# Patient Record
Sex: FEMALE | Race: WHITE | Employment: OTHER | URBAN - METROPOLITAN AREA
[De-identification: names, ages, dates, MRNs, and addresses within clinical notes are randomized per-mention and may not be internally consistent; named-entity substitution may affect disease eponyms.]

---

## 2017-01-01 ENCOUNTER — OFFICE VISIT (OUTPATIENT)
Dept: GERIATRIC MEDICINE | Age: 82
End: 2017-01-01

## 2017-01-01 ENCOUNTER — OFFICE VISIT (OUTPATIENT)
Dept: PULMONOLOGY | Age: 82
End: 2017-01-01

## 2017-01-01 VITALS
SYSTOLIC BLOOD PRESSURE: 174 MMHG | OXYGEN SATURATION: 96 % | RESPIRATION RATE: 18 BRPM | TEMPERATURE: 96 F | DIASTOLIC BLOOD PRESSURE: 58 MMHG | HEART RATE: 91 BPM

## 2017-01-01 VITALS
TEMPERATURE: 97.6 F | RESPIRATION RATE: 18 BRPM | DIASTOLIC BLOOD PRESSURE: 73 MMHG | OXYGEN SATURATION: 98 % | HEART RATE: 86 BPM | SYSTOLIC BLOOD PRESSURE: 139 MMHG

## 2017-01-01 VITALS
DIASTOLIC BLOOD PRESSURE: 96 MMHG | RESPIRATION RATE: 21 BRPM | TEMPERATURE: 97.5 F | HEART RATE: 70 BPM | SYSTOLIC BLOOD PRESSURE: 193 MMHG | HEART RATE: 78 BPM | OXYGEN SATURATION: 99 % | SYSTOLIC BLOOD PRESSURE: 118 MMHG | HEIGHT: 67 IN | DIASTOLIC BLOOD PRESSURE: 84 MMHG

## 2017-01-01 VITALS
SYSTOLIC BLOOD PRESSURE: 133 MMHG | DIASTOLIC BLOOD PRESSURE: 44 MMHG | TEMPERATURE: 98.4 F | OXYGEN SATURATION: 96 % | HEART RATE: 82 BPM

## 2017-01-01 VITALS
DIASTOLIC BLOOD PRESSURE: 87 MMHG | RESPIRATION RATE: 18 BRPM | HEART RATE: 78 BPM | SYSTOLIC BLOOD PRESSURE: 120 MMHG | TEMPERATURE: 97 F

## 2017-01-01 VITALS
RESPIRATION RATE: 18 BRPM | SYSTOLIC BLOOD PRESSURE: 115 MMHG | HEART RATE: 83 BPM | OXYGEN SATURATION: 92 % | TEMPERATURE: 98.8 F | DIASTOLIC BLOOD PRESSURE: 60 MMHG

## 2017-01-01 VITALS
HEART RATE: 82 BPM | TEMPERATURE: 97.7 F | DIASTOLIC BLOOD PRESSURE: 63 MMHG | SYSTOLIC BLOOD PRESSURE: 107 MMHG | OXYGEN SATURATION: 100 % | RESPIRATION RATE: 18 BRPM

## 2017-01-01 DIAGNOSIS — R53.1 WEAKNESS: ICD-10-CM

## 2017-01-01 DIAGNOSIS — E87.70 FLUID OVERLOAD, UNSPECIFIED: ICD-10-CM

## 2017-01-01 DIAGNOSIS — J18.9 PNEUMONIA DUE TO INFECTIOUS ORGANISM, UNSPECIFIED LATERALITY, UNSPECIFIED PART OF LUNG: Primary | ICD-10-CM

## 2017-01-01 DIAGNOSIS — N18.9 CHRONIC KIDNEY DISEASE, UNSPECIFIED CKD STAGE: ICD-10-CM

## 2017-01-01 DIAGNOSIS — E87.70 HYPERVOLEMIA, UNSPECIFIED HYPERVOLEMIA TYPE: Primary | ICD-10-CM

## 2017-01-01 DIAGNOSIS — G47.33 OSA (OBSTRUCTIVE SLEEP APNEA): ICD-10-CM

## 2017-01-01 DIAGNOSIS — R13.10 DYSPHAGIA, UNSPECIFIED TYPE: Primary | ICD-10-CM

## 2017-01-01 DIAGNOSIS — J44.1 COPD EXACERBATION (HCC): Primary | ICD-10-CM

## 2017-01-01 DIAGNOSIS — E11.21 TYPE 2 DIABETES MELLITUS WITH DIABETIC NEPHROPATHY, UNSPECIFIED LONG TERM INSULIN USE STATUS: ICD-10-CM

## 2017-01-01 DIAGNOSIS — J18.9 PNEUMONIA DUE TO INFECTIOUS ORGANISM, UNSPECIFIED LATERALITY, UNSPECIFIED PART OF LUNG: ICD-10-CM

## 2017-01-01 DIAGNOSIS — R13.10 DYSPHAGIA, UNSPECIFIED TYPE: ICD-10-CM

## 2017-01-01 DIAGNOSIS — J44.9 CHRONIC OBSTRUCTIVE PULMONARY DISEASE, UNSPECIFIED COPD TYPE (HCC): Primary | ICD-10-CM

## 2017-01-01 DIAGNOSIS — R25.1 TREMORS OF NERVOUS SYSTEM: ICD-10-CM

## 2017-01-01 DIAGNOSIS — F41.9 ANXIETY: ICD-10-CM

## 2017-01-01 DIAGNOSIS — E87.70 HYPERVOLEMIA, UNSPECIFIED HYPERVOLEMIA TYPE: ICD-10-CM

## 2017-01-01 DIAGNOSIS — R09.02 HYPOXEMIA: ICD-10-CM

## 2017-01-01 DIAGNOSIS — K59.00 CONSTIPATION, UNSPECIFIED CONSTIPATION TYPE: ICD-10-CM

## 2017-01-01 DIAGNOSIS — J44.9 CHRONIC OBSTRUCTIVE PULMONARY DISEASE, UNSPECIFIED COPD TYPE (HCC): ICD-10-CM

## 2017-01-01 DIAGNOSIS — I50.9 CONGESTIVE HEART FAILURE, UNSPECIFIED CONGESTIVE HEART FAILURE CHRONICITY, UNSPECIFIED CONGESTIVE HEART FAILURE TYPE: ICD-10-CM

## 2017-01-01 DIAGNOSIS — I10 ESSENTIAL HYPERTENSION: Primary | ICD-10-CM

## 2017-01-01 DIAGNOSIS — E78.49 OTHER HYPERLIPIDEMIA: ICD-10-CM

## 2017-01-01 DIAGNOSIS — E11.42 DIABETIC PERIPHERAL NEUROPATHY (HCC): ICD-10-CM

## 2017-01-01 DIAGNOSIS — H61.23 BILATERAL IMPACTED CERUMEN: ICD-10-CM

## 2017-01-01 DIAGNOSIS — G47.33 OSA (OBSTRUCTIVE SLEEP APNEA): Primary | ICD-10-CM

## 2017-01-01 DIAGNOSIS — R63.5 ABNORMAL WEIGHT GAIN: ICD-10-CM

## 2017-01-01 DIAGNOSIS — E11.22 TYPE 2 DIABETES MELLITUS WITH STAGE 2 CHRONIC KIDNEY DISEASE, UNSPECIFIED LONG TERM INSULIN USE STATUS: Primary | ICD-10-CM

## 2017-01-01 DIAGNOSIS — E11.9 TYPE 2 DIABETES MELLITUS WITHOUT COMPLICATION, UNSPECIFIED LONG TERM INSULIN USE STATUS: Primary | ICD-10-CM

## 2017-01-01 DIAGNOSIS — G31.84 MCI (MILD COGNITIVE IMPAIRMENT): ICD-10-CM

## 2017-01-01 DIAGNOSIS — E87.5 HYPERKALEMIA: Primary | ICD-10-CM

## 2017-01-01 DIAGNOSIS — E11.9 TYPE 2 DIABETES MELLITUS WITHOUT COMPLICATION, UNSPECIFIED LONG TERM INSULIN USE STATUS: ICD-10-CM

## 2017-01-01 DIAGNOSIS — D64.9 ANEMIA, UNSPECIFIED TYPE: ICD-10-CM

## 2017-01-01 DIAGNOSIS — E78.5 HYPERLIPIDEMIA, UNSPECIFIED HYPERLIPIDEMIA TYPE: ICD-10-CM

## 2017-01-01 DIAGNOSIS — N18.2 TYPE 2 DIABETES MELLITUS WITH STAGE 2 CHRONIC KIDNEY DISEASE, UNSPECIFIED LONG TERM INSULIN USE STATUS: Primary | ICD-10-CM

## 2017-01-01 DIAGNOSIS — I10 ESSENTIAL HYPERTENSION: ICD-10-CM

## 2017-01-01 DIAGNOSIS — E87.70 FLUID OVERLOAD, UNSPECIFIED: Primary | ICD-10-CM

## 2017-01-01 DIAGNOSIS — E87.5 HYPERKALEMIA: ICD-10-CM

## 2017-01-01 DIAGNOSIS — R30.0 DYSURIA: ICD-10-CM

## 2017-01-01 LAB
AVERAGE GLUCOSE: NORMAL
AVERAGE GLUCOSE: NORMAL
BASOPHILS ABSOLUTE: 0 /ΜL
BASOPHILS ABSOLUTE: ABNORMAL /ΜL
BASOPHILS ABSOLUTE: ABNORMAL /ΜL
BASOPHILS RELATIVE PERCENT: 0.6 %
BASOPHILS RELATIVE PERCENT: ABNORMAL %
BASOPHILS RELATIVE PERCENT: ABNORMAL %
BUN BLDV-MCNC: 31 MG/DL
BUN BLDV-MCNC: 36 MG/DL
BUN BLDV-MCNC: 37 MG/DL
BUN BLDV-MCNC: 39 MG/DL
CALCIUM SERPL-MCNC: 9 MG/DL
CALCIUM SERPL-MCNC: 9.4 MG/DL
CALCIUM SERPL-MCNC: 9.5 MG/DL
CALCIUM SERPL-MCNC: 9.7 MG/DL
CHLORIDE BLD-SCNC: 100 MMOL/L
CHLORIDE BLD-SCNC: 103 MMOL/L
CHLORIDE BLD-SCNC: 96 MMOL/L
CHLORIDE BLD-SCNC: 99 MMOL/L
CHOLESTEROL, TOTAL: 122 MG/DL
CHOLESTEROL, TOTAL: 124 MG/DL
CHOLESTEROL/HDL RATIO: ABNORMAL
CHOLESTEROL/HDL RATIO: NORMAL
CO2: 25 MMOL/L
CO2: 27 MMOL/L
CO2: 28 MMOL/L
CO2: 29 MMOL/L
CREAT SERPL-MCNC: 1.43 MG/DL
CREAT SERPL-MCNC: 1.84 MG/DL
CREAT SERPL-MCNC: 1.96 MG/DL
CREAT SERPL-MCNC: 2.02 MG/DL
EOSINOPHILS ABSOLUTE: 0.6 /ΜL
EOSINOPHILS ABSOLUTE: ABNORMAL /ΜL
EOSINOPHILS ABSOLUTE: ABNORMAL /ΜL
EOSINOPHILS RELATIVE PERCENT: 7 %
EOSINOPHILS RELATIVE PERCENT: ABNORMAL %
EOSINOPHILS RELATIVE PERCENT: ABNORMAL %
FERRITIN: 702 NG/ML (ref 9–150)
GFR CALCULATED: 34.6
GFR CALCULATED: NORMAL
GLUCOSE BLD-MCNC: 127 MG/DL
GLUCOSE BLD-MCNC: 130 MG/DL
GLUCOSE BLD-MCNC: 200 MG/DL
GLUCOSE BLD-MCNC: 87 MG/DL
HBA1C MFR BLD: 6.7 %
HBA1C MFR BLD: 7.3 %
HCT VFR BLD CALC: 26.9 % (ref 36–46)
HCT VFR BLD CALC: 27.5 % (ref 36–46)
HCT VFR BLD CALC: 28.6 % (ref 36–46)
HCT VFR BLD CALC: 31.8 % (ref 36–46)
HDLC SERPL-MCNC: 32 MG/DL (ref 35–70)
HDLC SERPL-MCNC: 35 MG/DL (ref 35–70)
HEMOGLOBIN: 10.2 G/DL (ref 12–16)
HEMOGLOBIN: 8.6 G/DL (ref 12–16)
HEMOGLOBIN: 9.1 G/DL (ref 12–16)
IRON: 48
LDL CHOLESTEROL CALCULATED: 49 MG/DL (ref 0–160)
LDL CHOLESTEROL CALCULATED: NORMAL MG/DL (ref 0–160)
LYMPHOCYTES ABSOLUTE: 1.2 /ΜL
LYMPHOCYTES ABSOLUTE: ABNORMAL /ΜL
LYMPHOCYTES ABSOLUTE: ABNORMAL /ΜL
LYMPHOCYTES RELATIVE PERCENT: 14 %
LYMPHOCYTES RELATIVE PERCENT: ABNORMAL %
LYMPHOCYTES RELATIVE PERCENT: ABNORMAL %
MCH RBC QN AUTO: 32.6 PG
MCH RBC QN AUTO: 32.7 PG
MCH RBC QN AUTO: 34.7 PG
MCHC RBC AUTO-ENTMCNC: 31.9 G/DL
MCHC RBC AUTO-ENTMCNC: 32 G/DL
MCHC RBC AUTO-ENTMCNC: 32.2 G/DL
MCV RBC AUTO: 101.2 FL
MCV RBC AUTO: 102.4 FL
MCV RBC AUTO: 108.6 FL
MONOCYTES ABSOLUTE: 0.3 /ΜL
MONOCYTES ABSOLUTE: ABNORMAL /ΜL
MONOCYTES ABSOLUTE: ABNORMAL /ΜL
MONOCYTES RELATIVE PERCENT: 4.3 %
MONOCYTES RELATIVE PERCENT: ABNORMAL %
MONOCYTES RELATIVE PERCENT: ABNORMAL %
NEUTROPHILS ABSOLUTE: 6.3 /ΜL
NEUTROPHILS ABSOLUTE: ABNORMAL /ΜL
NEUTROPHILS ABSOLUTE: ABNORMAL /ΜL
NEUTROPHILS RELATIVE PERCENT: 73 %
NEUTROPHILS RELATIVE PERCENT: ABNORMAL %
NEUTROPHILS RELATIVE PERCENT: ABNORMAL %
PLATELET # BLD: 162 K/ΜL
PLATELET # BLD: 200 K/ΜL
PLATELET # BLD: 262 K/ΜL
PMV BLD AUTO: ABNORMAL FL
POTASSIUM (K+): 4.9
POTASSIUM (K+): 5.1
POTASSIUM (K+): 5.5
POTASSIUM SERPL-SCNC: 5.4 MMOL/L
POTASSIUM SERPL-SCNC: 5.4 MMOL/L
POTASSIUM SERPL-SCNC: 5.8 MMOL/L
POTASSIUM SERPL-SCNC: 6 MMOL/L
RBC # BLD: 2.48 10^6/ΜL
RBC # BLD: 2.79 10^6/ΜL
RBC # BLD: 3.15 10^6/ΜL
RETICULOCYTE ABSOLUTE COUNT: 0.05
RETICULOCYTE COUNT PCT: 2
SODIUM BLD-SCNC: 139 MMOL/L
SODIUM BLD-SCNC: 139 MMOL/L
SODIUM BLD-SCNC: 141 MMOL/L
SODIUM BLD-SCNC: 143 MMOL/L
TRIGL SERPL-MCNC: 201 MG/DL
TRIGL SERPL-MCNC: 205 MG/DL
VITAMIN B-12: >2000
VLDLC SERPL CALC-MCNC: ABNORMAL MG/DL
VLDLC SERPL CALC-MCNC: NORMAL MG/DL
WBC # BLD: 11 10^3/ML
WBC # BLD: 8.3 10^3/ML
WBC # BLD: 8.7 10^3/ML

## 2017-01-01 PROCEDURE — 99309 SBSQ NF CARE MODERATE MDM 30: CPT | Performed by: NURSE PRACTITIONER

## 2017-01-01 PROCEDURE — 1123F ACP DISCUSS/DSCN MKR DOCD: CPT | Performed by: NURSE PRACTITIONER

## 2017-01-01 PROCEDURE — G8926 SPIRO NO PERF OR DOC: HCPCS | Performed by: INTERNAL MEDICINE

## 2017-01-01 PROCEDURE — 1090F PRES/ABSN URINE INCON ASSESS: CPT | Performed by: INTERNAL MEDICINE

## 2017-01-01 PROCEDURE — 1123F ACP DISCUSS/DSCN MKR DOCD: CPT | Performed by: INTERNAL MEDICINE

## 2017-01-01 PROCEDURE — 99308 SBSQ NF CARE LOW MDM 20: CPT | Performed by: INTERNAL MEDICINE

## 2017-01-01 PROCEDURE — G8484 FLU IMMUNIZE NO ADMIN: HCPCS | Performed by: INTERNAL MEDICINE

## 2017-01-01 PROCEDURE — G8421 BMI NOT CALCULATED: HCPCS | Performed by: INTERNAL MEDICINE

## 2017-01-01 PROCEDURE — 99305 1ST NF CARE MODERATE MDM 35: CPT | Performed by: INTERNAL MEDICINE

## 2017-01-01 PROCEDURE — 4040F PNEUMOC VAC/ADMIN/RCVD: CPT | Performed by: INTERNAL MEDICINE

## 2017-01-01 PROCEDURE — 99214 OFFICE O/P EST MOD 30 MIN: CPT | Performed by: INTERNAL MEDICINE

## 2017-01-01 PROCEDURE — 3023F SPIROM DOC REV: CPT | Performed by: INTERNAL MEDICINE

## 2017-01-01 PROCEDURE — 99308 SBSQ NF CARE LOW MDM 20: CPT | Performed by: NURSE PRACTITIONER

## 2017-01-01 PROCEDURE — G8427 DOCREV CUR MEDS BY ELIG CLIN: HCPCS | Performed by: INTERNAL MEDICINE

## 2017-01-01 PROCEDURE — 1036F TOBACCO NON-USER: CPT | Performed by: INTERNAL MEDICINE

## 2017-01-01 RX ORDER — FUROSEMIDE 40 MG/1
40 TABLET ORAL DAILY
Qty: 60 TABLET | Refills: 3
Start: 2017-01-01

## 2017-01-01 RX ORDER — GREEN TEA/HOODIA GORDONII 315-12.5MG
1 CAPSULE ORAL 3 TIMES DAILY
Qty: 33 TABLET | Refills: 0
Start: 2017-01-01 | End: 2017-01-01

## 2017-01-01 RX ORDER — SODIUM POLYSTYRENE SULFONATE 15 G/60ML
30 SUSPENSION ORAL; RECTAL ONCE
Qty: 120 ML | Refills: 0
Start: 2017-01-01 | End: 2017-01-01

## 2017-01-01 RX ORDER — DOCUSATE SODIUM 100 MG/1
100 CAPSULE, LIQUID FILLED ORAL 2 TIMES DAILY
Qty: 60 CAPSULE | Refills: 0
Start: 2017-01-01

## 2017-01-01 RX ORDER — HYDROCODONE BITARTRATE AND ACETAMINOPHEN 5; 325 MG/1; MG/1
1 TABLET ORAL EVERY 6 HOURS PRN
COMMUNITY

## 2017-01-01 RX ORDER — AMOXICILLIN AND CLAVULANATE POTASSIUM 875; 125 MG/1; MG/1
1 TABLET, FILM COATED ORAL EVERY 12 HOURS
Qty: 14 TABLET | Refills: 0
Start: 2017-01-01 | End: 2017-01-01

## 2017-01-01 RX ORDER — TRAMADOL HYDROCHLORIDE 50 MG/1
50 TABLET ORAL EVERY 8 HOURS PRN
Qty: 60 TABLET | Refills: 0 | Status: SHIPPED | OUTPATIENT
Start: 2017-01-01 | End: 2017-01-01

## 2017-01-01 RX ORDER — SENNA PLUS 8.6 MG/1
1 TABLET ORAL SEE ADMIN INSTRUCTIONS
COMMUNITY

## 2017-01-01 ASSESSMENT — ENCOUNTER SYMPTOMS
RHINORRHEA: 0
COUGH: 1
TROUBLE SWALLOWING: 0
ABDOMINAL PAIN: 0
SINUS PRESSURE: 0
DIARRHEA: 0
SORE THROAT: 0
VOMITING: 0
EYE ITCHING: 0
SHORTNESS OF BREATH: 1
VOICE CHANGE: 0
EYE DISCHARGE: 0
CHEST TIGHTNESS: 0
NAUSEA: 0
WHEEZING: 1

## 2017-01-04 ENCOUNTER — OFFICE VISIT (OUTPATIENT)
Dept: GERIATRIC MEDICINE | Age: 82
End: 2017-01-04

## 2017-01-04 DIAGNOSIS — K64.9 HEMORRHOIDS, UNSPECIFIED HEMORRHOID TYPE: ICD-10-CM

## 2017-01-04 DIAGNOSIS — E87.70 FLUID OVERLOAD, UNSPECIFIED: Primary | ICD-10-CM

## 2017-01-12 LAB
BUN BLDV-MCNC: 33 MG/DL
CALCIUM SERPL-MCNC: 8.9 MG/DL
CHLORIDE BLD-SCNC: 102 MMOL/L
CO2: 23 MMOL/L
CREAT SERPL-MCNC: 1.78 MG/DL
GFR CALCULATED: NORMAL
GLUCOSE BLD-MCNC: 97 MG/DL
POTASSIUM SERPL-SCNC: 5 MMOL/L
SODIUM BLD-SCNC: 139 MMOL/L

## 2017-01-18 RX ORDER — FUROSEMIDE 20 MG/1
40 TABLET ORAL DAILY
Qty: 30 TABLET | Refills: 1 | Status: SHIPPED | OUTPATIENT
Start: 2017-01-18 | End: 2017-06-06

## 2017-02-01 ENCOUNTER — OFFICE VISIT (OUTPATIENT)
Dept: GERIATRIC MEDICINE | Age: 82
End: 2017-02-01

## 2017-02-01 DIAGNOSIS — E87.70 FLUID OVERLOAD, UNSPECIFIED: Primary | ICD-10-CM

## 2017-02-08 ENCOUNTER — OFFICE VISIT (OUTPATIENT)
Dept: GERIATRIC MEDICINE | Age: 82
End: 2017-02-08

## 2017-02-08 DIAGNOSIS — R13.10 DYSPHAGIA, UNSPECIFIED TYPE: Primary | ICD-10-CM

## 2017-02-09 VITALS — DIASTOLIC BLOOD PRESSURE: 75 MMHG | OXYGEN SATURATION: 98 % | HEART RATE: 75 BPM | SYSTOLIC BLOOD PRESSURE: 162 MMHG

## 2017-02-13 ASSESSMENT — ENCOUNTER SYMPTOMS
RHINORRHEA: 0
SHORTNESS OF BREATH: 0
VOICE CHANGE: 0
SINUS PRESSURE: 0
SORE THROAT: 0
TROUBLE SWALLOWING: 0
CHOKING: 0
STRIDOR: 0
WHEEZING: 0
COUGH: 0

## 2017-02-14 LAB — HBA1C MFR BLD: 6 %

## 2017-04-13 LAB
BILIRUBIN, URINE: NEGATIVE
BLOOD, URINE: NEGATIVE
CLARITY: ABNORMAL
COLOR: YELLOW
GLUCOSE URINE: ABNORMAL
KETONES, URINE: NEGATIVE
LEUKOCYTE ESTERASE, URINE: POSITIVE
NITRITE, URINE: NEGATIVE
PH UA: 6.5 (ref 4.5–8)
PROTEIN UA: NEGATIVE
SPECIFIC GRAVITY, URINE: 1.01
UROBILINOGEN, URINE: NORMAL

## 2017-05-19 ENCOUNTER — OFFICE VISIT (OUTPATIENT)
Dept: GERIATRIC MEDICINE | Age: 82
End: 2017-05-19

## 2017-05-19 DIAGNOSIS — R19.7 DIARRHEA, UNSPECIFIED TYPE: Primary | ICD-10-CM

## 2017-05-19 DIAGNOSIS — E11.9 TYPE 2 DIABETES MELLITUS WITHOUT COMPLICATION, UNSPECIFIED LONG TERM INSULIN USE STATUS: ICD-10-CM

## 2017-05-19 DIAGNOSIS — R53.1 WEAKNESS: ICD-10-CM

## 2017-06-06 RX ORDER — GABAPENTIN 300 MG/1
300 CAPSULE ORAL 3 TIMES DAILY
COMMUNITY

## 2017-06-06 RX ORDER — M-VIT,TX,IRON,MINS/CALC/FOLIC 27MG-0.4MG
1 TABLET ORAL DAILY
COMMUNITY
End: 2018-01-01 | Stop reason: SDUPTHER

## 2017-06-06 RX ORDER — FUROSEMIDE 40 MG/1
40 TABLET ORAL 2 TIMES DAILY
COMMUNITY
End: 2017-01-01 | Stop reason: DRUGHIGH

## 2017-06-06 RX ORDER — PANTOPRAZOLE SODIUM 20 MG/1
20 TABLET, DELAYED RELEASE ORAL DAILY
COMMUNITY
End: 2018-01-01 | Stop reason: SDUPTHER

## 2017-06-06 RX ORDER — CHOLECALCIFEROL (VITAMIN D3) 125 MCG
250 CAPSULE ORAL DAILY
COMMUNITY

## 2017-06-06 RX ORDER — GUAIFENESIN AND DEXTROMETHORPHAN HYDROBROMIDE 100; 10 MG/5ML; MG/5ML
5 SOLUTION ORAL EVERY 4 HOURS PRN
COMMUNITY

## 2017-06-06 RX ORDER — DULOXETIN HYDROCHLORIDE 60 MG/1
60 CAPSULE, DELAYED RELEASE ORAL DAILY
COMMUNITY

## 2017-06-06 RX ORDER — FERROUS SULFATE 325(65) MG
325 TABLET ORAL
COMMUNITY

## 2017-06-06 RX ORDER — MAGNESIUM OXIDE 400 MG/1
400 TABLET ORAL DAILY
COMMUNITY

## 2017-06-06 RX ORDER — ACETAMINOPHEN 325 MG/1
650 TABLET ORAL EVERY 6 HOURS PRN
COMMUNITY
End: 2018-01-01 | Stop reason: SDUPTHER

## 2017-06-06 RX ORDER — ACETAMINOPHEN 650 MG/1
650 SUPPOSITORY RECTAL EVERY 4 HOURS PRN
COMMUNITY
End: 2018-01-01 | Stop reason: SDUPTHER

## 2017-06-06 RX ORDER — CETIRIZINE HYDROCHLORIDE 10 MG/1
10 TABLET ORAL DAILY
COMMUNITY

## 2017-06-06 RX ORDER — BISACODYL 10 MG
10 SUPPOSITORY, RECTAL RECTAL DAILY
COMMUNITY

## 2017-06-06 RX ORDER — HYDROCORTISONE ACETATE 25 MG/1
25 SUPPOSITORY RECTAL 2 TIMES DAILY
COMMUNITY

## 2017-06-06 RX ORDER — TRAMADOL HYDROCHLORIDE 50 MG/1
50 TABLET ORAL EVERY 6 HOURS PRN
COMMUNITY
End: 2017-01-01 | Stop reason: SDUPTHER

## 2017-06-06 RX ORDER — LOPERAMIDE HYDROCHLORIDE 2 MG/1
2 TABLET ORAL 4 TIMES DAILY PRN
COMMUNITY

## 2017-06-06 RX ORDER — GUAIFENESIN 400 MG/1
400 TABLET ORAL 4 TIMES DAILY PRN
COMMUNITY

## 2017-06-06 RX ORDER — ALBUTEROL SULFATE 2.5 MG/3ML
2.5 SOLUTION RESPIRATORY (INHALATION) EVERY 6 HOURS PRN
COMMUNITY
End: 2018-01-01 | Stop reason: SDUPTHER

## 2017-06-06 RX ORDER — MIRTAZAPINE 15 MG/1
7.5 TABLET, FILM COATED ORAL NIGHTLY
COMMUNITY

## 2017-06-06 RX ORDER — ATORVASTATIN CALCIUM 20 MG/1
20 TABLET, FILM COATED ORAL DAILY
COMMUNITY

## 2017-06-28 PROBLEM — E87.70 FLUID OVERLOAD: Status: ACTIVE | Noted: 2017-01-01

## 2017-07-12 PROBLEM — F41.9 ANXIETY: Status: ACTIVE | Noted: 2017-01-01

## 2017-07-12 PROBLEM — R13.10 DYSPHAGIA: Status: ACTIVE | Noted: 2017-01-01

## 2017-07-12 PROBLEM — J18.9 PNEUMONIA: Status: ACTIVE | Noted: 2017-01-01

## 2017-08-04 PROBLEM — R63.5 ABNORMAL WEIGHT GAIN: Status: ACTIVE | Noted: 2017-01-01

## 2017-08-04 PROBLEM — H61.20 CERUMEN IMPACTION: Status: ACTIVE | Noted: 2017-01-01

## 2017-09-24 PROBLEM — R53.1 WEAKNESS: Status: ACTIVE | Noted: 2017-01-01

## 2017-10-05 PROBLEM — D64.9 ANEMIA: Status: ACTIVE | Noted: 2017-01-01

## 2017-10-05 PROBLEM — K59.00 CONSTIPATION: Status: ACTIVE | Noted: 2017-01-01

## 2017-10-05 NOTE — PROGRESS NOTES
PATIENT: Eligio Kraft : 1930 DOS: 2017     [address] [place] [address]  Chief Complaint   Patient presents with    Constipation    Anemia    Other     Fluid Overload    Pain     Neuropathic       REASON FOR VISIT: The patient being seen today for acute visit for reports of fluid overload, neuropathic pain, constipation, and anemia. SUBJECTIVE: Resident reports she has increased leg pain for the past 2 weeks. She states when this occurs, she usually has potassium elevations. Otherwise, she offers concern that her legs are more swollen than normal and they are hurting more than normal. She also complains of constipation. Nursing staff offer no new concerns regarding the resident's medical status or behavior. FAMILY AND SOCIAL HISTORY: Refer to H&P. Past Medical History:   Diagnosis Date    Asthma     Depression     Diabetic peripheral neuropathy (HCC)     Diverticulosis     DJD (degenerative joint disease)     DM2 (diabetes mellitus, type 2) (HCC)     Hyperlipidemia     Hypertension     Obesity, morbid ([address]     SOULEYMANE (obstructive sleep apnea)     Osteoarthritis     Spinal stenosis        MEDICATIONS AND ALLERGIES: Reviewed on chart at nursing facility. REVIEW OF SYSTEMS: Resident denies any headache, dizziness, blurred vision, chest pain, shortness of breath, abdominal pain, nausea, vomiting. She does report that her legs are more swollen than normal and they hurt. She also reports that normally when she experiences the pain in her legs that she has the potassium elevation. She states that she has had this pain for 2 weeks now and her potassium has kind of been all over the place. She has had to receive several doses of Kayexalate to bring it down. Otherwise, she offers no concerns. PHYSICAL EXAMINATION: Most recent vital signs: /96, heart rate 78, respirations 21.  CONSTITUTIONAL: Resident is alert and oriented x3, elderly female, in no apparent distress, somewhat

## 2017-11-07 NOTE — PROGRESS NOTES
affect. Current Outpatient Prescriptions   Medication Sig Dispense Refill    docusate sodium (COLACE) 100 MG capsule Take 1 capsule by mouth 2 times daily 60 capsule 0    furosemide (LASIX) 40 MG tablet Take 1 tablet by mouth daily 60 tablet 3    senna (SENOKOT) 8.6 MG tablet Take 1 tablet by mouth daily      metFORMIN (GLUCOPHAGE) 500 MG tablet Take 500 mg by mouth 2 times daily (with meals)      glucose blood VI test strips (ACCU-CHEK ACTIVE STRIPS) strip 1 each by In Vitro route daily As needed.       atorvastatin (LIPITOR) 20 MG tablet Take 20 mg by mouth daily [id number]      cetirizine (ZYRTEC) 10 MG tablet Take 10 mg by mouth daily      DULoxetine (CYMBALTA) 60 MG extended release capsule Take 60 mg by mouth daily      gabapentin (NEURONTIN) 300 MG capsule Take 300 mg by mouth 3 times daily      guaiFENesin 400 MG tablet Take 400 mg by mouth 4 times daily as needed for Cough      metoprolol tartrate (LOPRESSOR) 25 MG tablet Take 25 mg by mouth 2 times daily      mirtazapine (REMERON) 15 MG tablet Take 7.5 mg by mouth nightly       naproxen-diphenhydramine 220-25 MG TABS Take by mouth      pantoprazole (PROTONIX) 20 MG tablet Take 20 mg by mouth daily      vitamin B-12 (CYANOCOBALAMIN) 500 MCG tablet Take 500 mcg by mouth daily      ferrous sulfate 325 (65 FE) MG tablet Take 325 mg by mouth daily (with breakfast)      magnesium oxide (MAG-OX) 400 MG tablet Take 400 mg by mouth daily      Multiple Vitamins-Minerals (THERAPEUTIC MULTIVITAMIN-MINERALS) tablet Take 1 tablet by mouth daily      vitamin D (CHOLECALCIFEROL) 1000 UNIT TABS tablet Take 1,000 Units by mouth daily      acetaminophen (TYLENOL) 325 MG tablet Take 650 mg by mouth every 6 hours as needed for Pain      acetaminophen (TYLENOL) 650 MG suppository Place 650 mg rectally every 4 hours as needed for Fever      bisacodyl (DULCOLAX) 10 MG suppository Place 10 mg rectally daily      albuterol (PROVENTIL) (2.5 MG/3ML) 0.083% hypoxia on O2.       Maria Eugenia Plata MD

## 2017-11-28 NOTE — PROGRESS NOTES
[address] [facility]  Chief Complaint   Patient presents with    Anemia    Diabetes    Hyperlipidemia       REASON FOR VISIT: The patient being seen today for monthly visit for anemia, DM, HLD. SUBJECTIVE: Nursing staff report that resident's room was recently changed as she got into disagreement with her roommate. She has remained at her baseline. FAMILY AND SOCIAL HISTORY: Refer to H&P. Past Medical History:   Diagnosis Date    Asthma     Depression     Diabetic peripheral neuropathy (HCC)     Diverticulosis     DJD (degenerative joint disease)     DM2 (diabetes mellitus, type 2) (HCC)     Hyperlipidemia     Hypertension     Obesity, morbid ([address]     SOULEYMANE (obstructive sleep apnea)     Osteoarthritis     Spinal stenosis        MEDICATIONS AND ALLERGIES: Reviewed on chart at nursing facility. REVIEW OF SYSTEMS: Resident denies any headache, dizziness, blurred vision, chest pain, shortness of breath, abdominal pain, nausea, vomiting, or changes in her bowel or bladder habits. She does report of some chronic knee discomfort, otherwise she states she is doing well. She does report when she is having pain her pain medication is effective in meeting her pain needs. PHYSICAL EXAMINATION: Most recent vital signs: /87, temp 97.0, heart rate 78, respirations 18. CONSTITUTIONAL: Resident is alert and oriented x2, forgetful. INTEGUMENT: Pink, warm, dry. HEENT: Normocephalic, atraumatic. Hard of hearing. Conjunctivae pink. Sclerae nonicteric. Mucous membranes pink, moist. No oropharyngeal exudate. NECK: Supple. No JVD noted. CV: RRR. No MGR. RESPIRATORY: LS with faint wheezes. Respirations even, unlabored. She is on O2 at 2-1/2 L. ABDOMEN: Obese, soft, NT, ND. Normoactive bowel sounds. PV: Peripheral pulses present. She does have a trace of pitting edema to her bilateral lower extremities. ASSESSMENT AND PLAN:   1.  Anemia: Resident's hemoglobin is 9.3, hematocrit 28.8, white count

## 2017-12-10 PROBLEM — N18.9 CKD (CHRONIC KIDNEY DISEASE): Status: ACTIVE | Noted: 2017-01-01

## 2017-12-10 PROBLEM — I50.9 CHF (CONGESTIVE HEART FAILURE) (HCC): Status: ACTIVE | Noted: 2017-01-01

## 2017-12-29 NOTE — PROGRESS NOTES
PATIENT: Tc Benito : 1930 DOS: 2017     [address] [place] [address]  Chief Complaint   Patient presents with    Tremors       REASON FOR VISIT: The patient is being seen for acute visit for reports of tremors. SUBJECTIVE: Resident reports to nursing staff that she is shaking. She is not really sure why. Nursing staff report that resident has been doing this intermittently, otherwise they offer no concerns. FAMILY AND SOCIAL HISTORY: Refer to H&P. Past Medical History:   Diagnosis Date    Asthma     Depression     Diabetic peripheral neuropathy (HCC)     Diverticulosis     DJD (degenerative joint disease)     DM2 (diabetes mellitus, type 2) (HCC)     Hyperlipidemia     Hypertension     Obesity, morbid ([address]     SOULEYMANE (obstructive sleep apnea)     Osteoarthritis     Spinal stenosis        MEDICATIONS AND ALLERGIES: Reviewed on chart at nursing facility. REVIEW OF SYSTEMS:  Resident denies any headache, dizziness, blurred vision, chest pain, shortness of breath, abdominal pain, nausea, vomiting, or changes in her bowel or bladder habits. She reports she is eating okay, sleeping okay. The only concern is the tremor she is experiencing. PHYSICAL EXAMINATION: Most recent vital signs: /60, temp 98.8, heart rate 83, respirations 18, pulse oxing 92 to 93% on 2 L of O2. CONSTITUTIONAL: Resident is sleeping, but arousable, elderly female in no apparent distress. INTEGUMENT: Pink, warm, dry. HEENT: Normocephalic, atraumatic. Conjunctivae pink. Sclerae nonicteric. Mucous membranes pink, moist. No oropharyngeal exudate. NECK: Supple. No JVD noted. CV: RRR. No MGR. RESPIRATORY: LSCTA. Respirations even, unlabored. She is on O2 at 2-1/2 L. ABDOMEN: Obese, soft, NT, ND. Normoactive bowel sounds. PV: Peripheral pulses present. No edema noted. ASSESSMENT AND PLAN: Tremors:  We will DC the resident's tramadol since that can be a CNS stimulant and we will start her on Norco 5/325 p.o. q.6 p.r.n. for her chronic pain. I have reviewed this resident's medications and treatment plan as well as most recent lab work. No further changes will be made at this time. Return in about 1 month (around 1/8/2018), or if symptoms worsen or fail to improve, for chronic conditions. Electronically Signed By: Rush Vital CNP on 12/27/2017 19:46:58  ______________________________  Rush Lemus.  Juanito Benito, [address]  D: 12/21/2017 08:33:13  T: 12/22/2017 01:07:36    cc: - [id number]

## 2017-12-30 PROBLEM — R25.1 TREMORS OF NERVOUS SYSTEM: Status: ACTIVE | Noted: 2017-01-01

## 2017-12-31 NOTE — PROGRESS NOTES
PATIENT: Lj Tran : 1930 DOS: 2017     [address] [place] [address]  Chief Complaint   Patient presents with    Dysuria    Dysphagia       REASON FOR VISIT: The patient is being seen today for acute visit for reports of dysuria. Resident reports that she is having to drink thickened liquids that she does not like and she is requesting thin liquids. She also reports that she is having some burning with urination. Otherwise, she offers no concerns. Nursing staff offer no new concerns regarding the resident's medical status or behaviors. Nursing staff do report that the resident and her family did meet to discuss hospice today as the resident is requesting to have normal liquids. They are giving her an additional week on her thickened liquids to see how she does and then will make a decision from that point on. The resident continues to try to get regular liquids from staff members and she is reporting that she is not going to drink the thickened, otherwise no concerns. FAMILY AND SOCIAL HISTORY: Refer to H&P. Past Medical History:   Diagnosis Date    Asthma     Depression     Diabetic peripheral neuropathy (HCC)     Diverticulosis     DJD (degenerative joint disease)     DM2 (diabetes mellitus, type 2) (HCC)     Hyperlipidemia     Hypertension     Obesity, morbid ([address]     SOULEYMANE (obstructive sleep apnea)     Osteoarthritis     Spinal stenosis        MEDICATIONS AND ALLERGIES: Reviewed on chart at nursing facility. REVIEW OF SYSTEMS:  Resident denies any headache, dizziness, blurred vision, chest pain, shortness of breath, abdominal pain, nausea, vomiting, or changes in her bowel habits. She does report dysuria. She also reports that she does not like the thickened liquids they are making her drink and she wants regular liquids. PHYSICAL EXAMINATION: VITAL SIGNS: Stable, afebrile, reviewed on chart at nursing facility.  CONSTITUTIONAL: Resident is alert and oriented 2 to 3,

## 2018-01-01 ENCOUNTER — OFFICE VISIT (OUTPATIENT)
Dept: GERIATRIC MEDICINE | Age: 83
End: 2018-01-01
Payer: COMMERCIAL

## 2018-01-01 ENCOUNTER — OFFICE VISIT (OUTPATIENT)
Dept: GERIATRIC MEDICINE | Age: 83
End: 2018-01-01

## 2018-01-01 VITALS
DIASTOLIC BLOOD PRESSURE: 62 MMHG | SYSTOLIC BLOOD PRESSURE: 110 MMHG | TEMPERATURE: 96.5 F | RESPIRATION RATE: 18 BRPM | HEART RATE: 76 BPM

## 2018-01-01 VITALS
DIASTOLIC BLOOD PRESSURE: 75 MMHG | TEMPERATURE: 98 F | SYSTOLIC BLOOD PRESSURE: 126 MMHG | RESPIRATION RATE: 18 BRPM | HEART RATE: 83 BPM

## 2018-01-01 VITALS
DIASTOLIC BLOOD PRESSURE: 58 MMHG | HEART RATE: 79 BPM | OXYGEN SATURATION: 100 % | RESPIRATION RATE: 20 BRPM | TEMPERATURE: 97.7 F | SYSTOLIC BLOOD PRESSURE: 165 MMHG

## 2018-01-01 VITALS — SYSTOLIC BLOOD PRESSURE: 142 MMHG | TEMPERATURE: 98.8 F | DIASTOLIC BLOOD PRESSURE: 60 MMHG | HEART RATE: 76 BPM

## 2018-01-01 VITALS — TEMPERATURE: 97.2 F | DIASTOLIC BLOOD PRESSURE: 66 MMHG | HEART RATE: 80 BPM | SYSTOLIC BLOOD PRESSURE: 113 MMHG

## 2018-01-01 DIAGNOSIS — I50.9 CONGESTIVE HEART FAILURE, UNSPECIFIED CONGESTIVE HEART FAILURE CHRONICITY, UNSPECIFIED CONGESTIVE HEART FAILURE TYPE: ICD-10-CM

## 2018-01-01 DIAGNOSIS — R13.10 DYSPHAGIA, UNSPECIFIED TYPE: Primary | ICD-10-CM

## 2018-01-01 DIAGNOSIS — R53.1 WEAKNESS: ICD-10-CM

## 2018-01-01 DIAGNOSIS — F41.9 ANXIETY: Primary | ICD-10-CM

## 2018-01-01 DIAGNOSIS — J69.0 ASPIRATION PNEUMONIA, UNSPECIFIED ASPIRATION PNEUMONIA TYPE, UNSPECIFIED LATERALITY, UNSPECIFIED PART OF LUNG (HCC): ICD-10-CM

## 2018-01-01 DIAGNOSIS — I10 ESSENTIAL HYPERTENSION: ICD-10-CM

## 2018-01-01 DIAGNOSIS — J15.9 PNEUMONIA, BACTERIAL: Primary | ICD-10-CM

## 2018-01-01 DIAGNOSIS — J06.9 UPPER RESPIRATORY TRACT INFECTION, UNSPECIFIED TYPE: ICD-10-CM

## 2018-01-01 DIAGNOSIS — M15.9 PRIMARY OSTEOARTHRITIS INVOLVING MULTIPLE JOINTS: ICD-10-CM

## 2018-01-01 DIAGNOSIS — M15.9 PRIMARY OSTEOARTHRITIS INVOLVING MULTIPLE JOINTS: Primary | ICD-10-CM

## 2018-01-01 DIAGNOSIS — J06.9 UPPER RESPIRATORY TRACT INFECTION, UNSPECIFIED TYPE: Primary | ICD-10-CM

## 2018-01-01 DIAGNOSIS — J06.9 VIRAL URI: Primary | ICD-10-CM

## 2018-01-01 DIAGNOSIS — J44.9 CHRONIC OBSTRUCTIVE PULMONARY DISEASE, UNSPECIFIED COPD TYPE (HCC): Primary | ICD-10-CM

## 2018-01-01 DIAGNOSIS — I10 HYPERTENSION, UNSPECIFIED TYPE: ICD-10-CM

## 2018-01-01 PROBLEM — R30.0 DYSURIA: Status: ACTIVE | Noted: 2017-01-01

## 2018-01-01 PROCEDURE — 99308 SBSQ NF CARE LOW MDM 20: CPT | Performed by: NURSE PRACTITIONER

## 2018-01-01 PROCEDURE — 1123F ACP DISCUSS/DSCN MKR DOCD: CPT | Performed by: INTERNAL MEDICINE

## 2018-01-01 PROCEDURE — 1123F ACP DISCUSS/DSCN MKR DOCD: CPT | Performed by: NURSE PRACTITIONER

## 2018-01-01 PROCEDURE — 99306 1ST NF CARE HIGH MDM 50: CPT | Performed by: INTERNAL MEDICINE

## 2018-01-01 PROCEDURE — 99309 SBSQ NF CARE MODERATE MDM 30: CPT | Performed by: NURSE PRACTITIONER

## 2018-01-01 PROCEDURE — 99308 SBSQ NF CARE LOW MDM 20: CPT | Performed by: INTERNAL MEDICINE

## 2018-01-01 RX ORDER — IPRATROPIUM BROMIDE AND ALBUTEROL SULFATE 2.5; .5 MG/3ML; MG/3ML
1 SOLUTION RESPIRATORY (INHALATION) 4 TIMES DAILY
COMMUNITY

## 2018-01-01 RX ORDER — FUROSEMIDE 40 MG/1
40 TABLET ORAL DAILY
COMMUNITY
Start: 2018-01-01 | End: 2018-01-01

## 2018-01-01 RX ORDER — OMEPRAZOLE 20 MG/1
20 CAPSULE, DELAYED RELEASE ORAL DAILY
COMMUNITY

## 2018-01-01 RX ORDER — AMOXICILLIN AND CLAVULANATE POTASSIUM 875; 125 MG/1; MG/1
1 TABLET, FILM COATED ORAL 2 TIMES DAILY
COMMUNITY

## 2018-01-01 RX ORDER — LORAZEPAM 0.5 MG/1
0.25 TABLET ORAL EVERY 12 HOURS PRN
COMMUNITY

## 2018-01-01 RX ORDER — METHYLPREDNISOLONE 4 MG/1
4 TABLET ORAL SEE ADMIN INSTRUCTIONS
COMMUNITY

## 2018-01-01 RX ORDER — AZITHROMYCIN 1 G
1 PACKET (EA) ORAL ONCE
COMMUNITY

## 2018-01-02 NOTE — PROGRESS NOTES
PATIENT: Bard Mendoza : 1930 DOS: 2017     [address] [facility]    Readmission history and physical.    CHIEF COMPLAINT:  Shortness of breath, obesity, fluid overload. HISTORY OF PRESENT ILLNESS:  This is an 44-year-old woman who is known to our service. The patient was most recently hospitalized at [facility] with worsening shortness of breath. The patient was diuresed more aggressively. She was found to have evidence of possible fluid overload. She was stabilized. The patient did undergo a course of steroid with respiratory therapy. The patient did make gains. Cardiac function was monitored. She did undergo cardiac rule out. Blood sugars were monitored as she is diabetic. The patient's last A1c was 7.3. The patient did make gains and stabilized. She has possible dysphagia and was on mechanical soft diet. The patient has been on her oral regimen she tolerated. She was stabilized and transferred here for ongoing care. PAST MEDICAL HISTORY:  Obesity, CHF, diabetes, hyperlipidemia, depression, peripheral neuropathy, degenerative joint disease, spinal stenosis, dysphagia. MEDICATIONS:  On arrival included Norco 5/325 mg 1 tablet q.6 hours as needed, atorvastatin 20 mg at h.s., cetirizine 10 mg daily, duloxetine 60 mg daily, iron 325 mg daily, Lasix 40 mg daily, gabapentin 300 mg 3 times daily, amoxicillin clavulanate 875/125 mg 1 tablet twice daily for 10 days, metformin 500 mg twice daily, metoprolol 25 mg twice daily, Remeron 15 mg half tablet daily, senna 1 tablet daily p.o., Medrol Dosepak. FAMILY HISTORY:  Positive for coronary artery disease, diabetes. SOCIAL HISTORY:  The patient is currently nonsmoker, nondrinker. REVIEW OF SYSTEMS:  The patient denies chest pain, palpitations, nausea, vomiting, emesis. Denies headaches, fevers, chills. Denies change in her bowel or bladder habits. She is globally actually improved compared to her prior baseline.

## 2018-01-15 NOTE — PROGRESS NOTES
short of breath, coughing intermittently, anxious at this time. No change in her bowel or bladder habits. No change in her mentation. The rest of her 14-point review of systems was unremarkable. PHYSICAL EXAMINATION:  The patient's vital signs were stable. She is afebrile at 98.8, pulse 76, blood pressure 142/60. She is normocephalic, atraumatic. Alert and oriented x2 to 3. Chest showed diminished breath sounds at the left base. No wheezing. Cardiovascular exam showed a regular rate. Abdomen was obese with positive bowel sounds. Extremities showed trace dorsal pedal pulse, chronic stasis changes. SKIN:  No evidence of new rash. LYMPH:  No axillary or inguinal lymphadenopathy. ASSESSMENT AND PLAN:  1. Pneumonia, presumed aspiration type. The patient is on a course of antibiotic therapy, clinically stable. Continue respiratory treatments. The patient will be evaluated by speech therapy. If patient continues to be noncompliant, we will discuss hospice with her as the only way to meet her goals with dietary intake would as well as maximize her safety. 2.   Hypertension. Blood pressure is stable. No orthostasis. 3.   Degenerative joint disease. No new pain crisis. 4.   Weakness. The patient will undergo course of physical therapy, occupational therapy, skin surveillance, nutritional support with a goal of maximization of her functional status. Please note, hospital records, imaging reports, consultant notes, laboratory results were reviewed.         Electronically Signed By: Carmencita Zacarias M.D. on 01/10/2018 08:50:24  ______________________________  Carmencita Zacarias M.D.  [id number]  D: 01/08/2018 15:03:46  T: 01/09/2018 00:01:43    cc: - [id number]

## 2018-02-03 PROBLEM — J69.0 ASPIRATION PNEUMONIA (HCC): Status: ACTIVE | Noted: 2017-01-01

## 2018-02-19 NOTE — PROGRESS NOTES
PATIENT: Nadege Hou : 1930 DOS: 2018     [address] [place] [address]  Chief Complaint   Patient presents with    URI       REASON FOR VISIT: The patient is being seen today for acute visit for URI. SUBJECTIVE: Resident states that she does have cough and she is just bringing up clear sputum. She is on hospice services and has been drinking regular liquids per her request. Nursing staff report resident has remained at her baseline, although her cough does appear to be getting worse, otherwise they offer no concerns. FAMILY AND SOCIAL HISTORY: Refer to H&P. Past Medical History:   Diagnosis Date    Asthma     Depression     Diabetic peripheral neuropathy (HCC)     Diverticulosis     DJD (degenerative joint disease)     DM2 (diabetes mellitus, type 2) (HCC)     Hyperlipidemia     Hypertension     Obesity, morbid ([address]     SOULEYMANE (obstructive sleep apnea)     Osteoarthritis     Spinal stenosis        MEDICATIONS AND ALLERGIES: Reviewed on chart at nursing facility. REVIEW OF SYSTEMS:  Resident denies any headache, dizziness, blurred vision, chest pain. She does report that she is more congested and that she does have cough; however, she is only coughing up clear sputum. She denies any fever or chills, nausea or vomiting. She does report she is short of breath, but no more than normal; otherwise no concerns. PHYSICAL EXAMINATION: Most recent vital signs: /75, temp 98.0, heart rate 83, respirations 18. CONSTITUTIONAL: Resident is alert, oriented x2, elderly female in no apparent distress. Pleasant, cooperative with exam. INTEGUMENT: Pink, warm, dry. HEENT: Normocephalic, atraumatic. Conjunctivae pink. Sclerae nonicteric. Mucous membranes pink, moist. No oropharyngeal exudate. NECK: Supple. No JVD noted. CV: RRR. No MGR. RESPIRATORY: LSCTA, diminished throughout. Resident does have a moist cough with clear sputum on exam. ABDOMEN: Obese, soft, NT, ND.  Normoactive bowel

## 2018-02-21 PROBLEM — J06.9 URI (UPPER RESPIRATORY INFECTION): Status: ACTIVE | Noted: 2018-01-01

## 2018-03-01 NOTE — PROGRESS NOTES
PATIENT: Tino Solano : 1930 DOS: 2018     [facility] [address]    Seen for routine monthly visit for advanced COPD, weakness, dysphagia, recurrent URIs. MEDICATIONS:  Reviewed. SUBJECTIVE:  This is a very pleasant 54-year-old woman who was evaluated in her room. The patient is at her baseline coughing, intermittently she is globally weak. The patient has been treated on multiple courses of antibiotic therapy in the last 6 months. She has not had any new chest pain or palpitation. She is globally frail. OBJECTIVE:  She appeared at baseline. Pupils are equal and reactive. Oral mucosa is moist.  Chest showed coarse breath sounds. No rhonchi, no wheezing. Cardiovascular exam showed a regular rate. Abdomen was soft, nontender. Extremities showed trace dorsal pedal pulse. Chronic stasis changes. ASSESSMENT AND PLAN:  1.   URI:  The patient completed course of azithromycin at this time. 2.   Degenerative joint disease:  No new pain crisis. 3.   Hypertension:  Blood pressure is stable. We will monitor closely.           Electronically Signed By: Oliva Morales M.D. on 2018 11:32:52  ______________________________  Oliva Morales M.D.  [id number]  D: 2018 16:43:27  T: 2018 10:35:30    cc: - [id number] [facility]

## 2018-03-29 NOTE — PROGRESS NOTES
PATIENT: Pau Montano : 1930 DOS: 2018     [address] [place] [address]  Chief Complaint   Patient presents with    Congestive Heart Failure    URI       REASON FOR VISIT: The patient is being seen today for acute visit for CHF and upper respiratory infection. SUBJECTIVE: Resident offers no concerns. She does not understand why she is congested. She was advised prior to signing on with hospice that if she went back on a regular diet that more than likely she would end up with an upper respiratory infection. The patient still opted to sign on with hospice, so she can have her regular diet. Today, she states she is not feeling well. Nursing staff reports that her breathing is becoming more labored and she is not really eating well anymore; otherwise, they offer no concerns. Patient continues to be followed by hospice. FAMILY AND SOCIAL HISTORY: Refer to H&P. Past Medical History:   Diagnosis Date    Asthma     Depression     Diabetic peripheral neuropathy (HCC)     Diverticulosis     DJD (degenerative joint disease)     DM2 (diabetes mellitus, type 2) (HCC)     Hyperlipidemia     Hypertension     Obesity, morbid ([address]     SOULEYMANE (obstructive sleep apnea)     Osteoarthritis     Spinal stenosis        MEDICATIONS AND ALLERGIES: Reviewed on chart at nursing facility. REVIEW OF SYSTEMS: Resident denies any headache, dizziness, blurred vision. She does report shortness of breath that is increased, cough that is moist and nonproductive, and a poor appetite. She denies any chest pain, abdominal pain, nausea or vomiting, and she does report increase in her bilateral lower extremity edema. PHYSICAL EXAMINATION: Resident is alert, elderly female, in no apparent distress, pleasant, cooperative with exam. INTEGUMENT: Pink, warm, dry. HEENT: Conjunctivae pink. Sclerae nonicteric. Mucous membranes pink, moist. No oropharyngeal exudate. She does have rhonchi throughout.  She is on O2 at 4 L. Abdomen: Soft, NT, ND. Normoactive bowel sounds. PV: Peripheral pulses present. She does have NP edema to her bilateral lower extremities. ASSESSMENT AND PLAN:   1. CHF: We will give the resident additional 40 mg Lasix p.o. x2 days. 2.  COPD exacerbation: We will start the resident on a [address] and a Medrol Dosepak. No further changes will be made at this time. We will continue to monitor the resident for overall comfort, function, and safety. Return in about 1 week (around 3/18/2018) for COPD. Electronically Signed By: Paul Reese CNP on 03/24/2018 09:30:31  ______________________________  Paul Reese CNP  [id number]  D: 03/24/2018 01:58:28  T: 03/24/2018 06:56:03    cc: - [id number] [facility]

## 2018-04-11 PROBLEM — J06.9 URI (UPPER RESPIRATORY INFECTION): Status: RESOLVED | Noted: 2018-01-01 | Resolved: 2018-01-01

## 2018-06-14 VITALS — OXYGEN SATURATION: 93 % | RESPIRATION RATE: 20 BRPM | HEART RATE: 93 BPM

## 2018-07-21 PROBLEM — J06.9 URI (UPPER RESPIRATORY INFECTION): Status: RESOLVED | Noted: 2018-01-01 | Resolved: 2018-07-21
